# Patient Record
Sex: MALE | Race: WHITE | NOT HISPANIC OR LATINO | Employment: OTHER | URBAN - NONMETROPOLITAN AREA
[De-identification: names, ages, dates, MRNs, and addresses within clinical notes are randomized per-mention and may not be internally consistent; named-entity substitution may affect disease eponyms.]

---

## 2023-07-31 ENCOUNTER — HOSPITAL ENCOUNTER (EMERGENCY)
Facility: HOSPITAL | Age: 63
Discharge: HOME | End: 2023-07-31
Payer: MEDICARE

## 2023-07-31 VITALS
BODY MASS INDEX: 33.33 KG/M2 | HEART RATE: 73 BPM | WEIGHT: 225 LBS | SYSTOLIC BLOOD PRESSURE: 136 MMHG | DIASTOLIC BLOOD PRESSURE: 71 MMHG | TEMPERATURE: 98 F | RESPIRATION RATE: 16 BRPM | OXYGEN SATURATION: 95 % | HEIGHT: 69 IN

## 2023-07-31 DIAGNOSIS — S16.1XXA STRAIN OF NECK MUSCLE, INITIAL ENCOUNTER: ICD-10-CM

## 2023-07-31 DIAGNOSIS — M62.838 MUSCLE SPASMS OF NECK: Primary | ICD-10-CM

## 2023-07-31 DIAGNOSIS — M54.2 NECK PAIN: ICD-10-CM

## 2023-07-31 PROCEDURE — 63600175 PHARM REV CODE 636 W HCPCS: Performed by: NURSE PRACTITIONER

## 2023-07-31 PROCEDURE — 99284 EMERGENCY DEPT VISIT MOD MDM: CPT | Mod: GF | Performed by: NURSE PRACTITIONER

## 2023-07-31 PROCEDURE — 99284 EMERGENCY DEPT VISIT MOD MDM: CPT

## 2023-07-31 PROCEDURE — 96372 THER/PROPH/DIAG INJ SC/IM: CPT | Performed by: NURSE PRACTITIONER

## 2023-07-31 RX ORDER — CARVEDILOL 6.25 MG/1
6.25 TABLET ORAL 2 TIMES DAILY WITH MEALS
COMMUNITY

## 2023-07-31 RX ORDER — SACUBITRIL AND VALSARTAN 24; 26 MG/1; MG/1
1 TABLET, FILM COATED ORAL 2 TIMES DAILY
COMMUNITY

## 2023-07-31 RX ORDER — ORPHENADRINE CITRATE 30 MG/ML
60 INJECTION INTRAMUSCULAR; INTRAVENOUS
Status: COMPLETED | OUTPATIENT
Start: 2023-07-31 | End: 2023-07-31

## 2023-07-31 RX ORDER — CYCLOBENZAPRINE HCL 10 MG
10 TABLET ORAL 3 TIMES DAILY PRN
Qty: 15 TABLET | Refills: 0 | Status: SHIPPED | OUTPATIENT
Start: 2023-07-31 | End: 2023-08-05

## 2023-07-31 RX ORDER — DEXAMETHASONE SODIUM PHOSPHATE 4 MG/ML
4 INJECTION, SOLUTION INTRA-ARTICULAR; INTRALESIONAL; INTRAMUSCULAR; INTRAVENOUS; SOFT TISSUE
Status: COMPLETED | OUTPATIENT
Start: 2023-07-31 | End: 2023-07-31

## 2023-07-31 RX ORDER — METHYLPREDNISOLONE ACETATE 40 MG/ML
40 INJECTION, SUSPENSION INTRA-ARTICULAR; INTRALESIONAL; INTRAMUSCULAR; SOFT TISSUE ONCE
Status: COMPLETED | OUTPATIENT
Start: 2023-07-31 | End: 2023-07-31

## 2023-07-31 RX ORDER — EZETIMIBE 10 MG/1
10 TABLET ORAL DAILY
COMMUNITY

## 2023-07-31 RX ORDER — ROSUVASTATIN CALCIUM 20 MG/1
20 TABLET, COATED ORAL DAILY
COMMUNITY

## 2023-07-31 RX ORDER — CLOPIDOGREL BISULFATE 75 MG/1
75 TABLET ORAL DAILY
COMMUNITY

## 2023-07-31 RX ADMIN — DEXAMETHASONE SODIUM PHOSPHATE 4 MG: 4 INJECTION, SOLUTION INTRAMUSCULAR; INTRAVENOUS at 11:07

## 2023-07-31 RX ADMIN — METHYLPREDNISOLONE ACETATE 40 MG: 40 INJECTION, SUSPENSION INTRA-ARTICULAR; INTRALESIONAL; INTRAMUSCULAR; INTRASYNOVIAL; SOFT TISSUE at 11:07

## 2023-07-31 RX ADMIN — ORPHENADRINE CITRATE 60 MG: 60 INJECTION INTRAMUSCULAR; INTRAVENOUS at 11:07

## 2023-07-31 NOTE — DISCHARGE INSTRUCTIONS
May take Tylenol 500 mg one to two tabs by mouth every 6 hours as needed for pain.    Alternate ice pack 10-15 min with moist heat through out the day.  May use Biofreeze OTC as directed.    Take Flexeril as directed. Do not take while working or driving will cause drowsiness.

## 2023-07-31 NOTE — Clinical Note
"Eddi"Dayana Villanueva was seen and treated in our emergency department on 7/31/2023.  He may return to work on 08/02/2023.       If you have any questions or concerns, please don't hesitate to call.      ASHLY Caicedo"
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
## 2023-08-01 ENCOUNTER — TELEPHONE (OUTPATIENT)
Dept: EMERGENCY MEDICINE | Facility: HOSPITAL | Age: 63
End: 2023-08-01
Payer: MEDICARE

## 2024-09-18 ENCOUNTER — HOSPITAL ENCOUNTER (OUTPATIENT)
Facility: HOSPITAL | Age: 64
Discharge: HOME | End: 2024-09-18
Attending: ANESTHESIOLOGY | Admitting: ANESTHESIOLOGY
Payer: MEDICARE

## 2024-09-18 VITALS
SYSTOLIC BLOOD PRESSURE: 142 MMHG | DIASTOLIC BLOOD PRESSURE: 73 MMHG | OXYGEN SATURATION: 97 % | HEART RATE: 70 BPM | HEIGHT: 69 IN | BODY MASS INDEX: 34.66 KG/M2 | WEIGHT: 234 LBS | RESPIRATION RATE: 10 BRPM | TEMPERATURE: 99 F

## 2024-09-18 DIAGNOSIS — M70.60 TROCHANTERIC BURSITIS: ICD-10-CM

## 2024-09-18 PROCEDURE — 63600175 PHARM REV CODE 636 W HCPCS: Mod: JG | Performed by: ANESTHESIOLOGY

## 2024-09-18 PROCEDURE — 20610 DRAIN/INJ JOINT/BURSA W/O US: CPT | Mod: RT | Performed by: ANESTHESIOLOGY

## 2024-09-18 RX ORDER — BUPIVACAINE HYDROCHLORIDE 2.5 MG/ML
INJECTION, SOLUTION INFILTRATION; PERINEURAL CODE/TRAUMA/SEDATION MEDICATION
Status: DISCONTINUED | OUTPATIENT
Start: 2024-09-18 | End: 2024-09-18 | Stop reason: HOSPADM

## 2024-09-18 RX ORDER — TRIAMCINOLONE ACETONIDE 40 MG/ML
INJECTION, SUSPENSION INTRA-ARTICULAR; INTRAMUSCULAR CODE/TRAUMA/SEDATION MEDICATION
Status: DISCONTINUED | OUTPATIENT
Start: 2024-09-18 | End: 2024-09-18 | Stop reason: HOSPADM

## 2024-09-18 RX ORDER — SODIUM CHLORIDE 9 MG/ML
500 INJECTION, SOLUTION INTRAVENOUS CONTINUOUS
Status: DISCONTINUED | OUTPATIENT
Start: 2024-09-18 | End: 2024-09-18 | Stop reason: HOSPADM

## 2024-09-18 NOTE — OP NOTE
09/18/2024  Eddi Villanueva 1960    Preoperative diagnosis: Greater trochanteric bursitis     Postoperative diagnosis: Greater trochanteric bursitis     Procedure: Right greater trochanteric bursa injection     SURGEON: Dr. Branden Blake   COMPLICATIONS:  None                                        DRAINS AND PACKS:  None  ANESTHESIA:  Local                                             BLOOD LOSS:  None     The patient was identified in the holding area.  The risks and benefits of the procedure were again explained to the patient.  The patient agreed to proceed. The patients left GTB was marked with a skin pen and consent form was signed and obtained.  The patient was taken in stable condition to the operating room and placed in supine position on the C-Arm table. The site was prepped and draped in usual sterile fashion with Chloraprep.  A skin wheal of 0.25% (2.5mg/ml ) 1cc was raised over the target area of the greater trochanteric bursa.  A 25-gauge 31/2 inch needle was advanced down to osseous contact at the location of the greater trochanteric bursa.  A single injection of 3 cc 0.25% Bupivacaine(2.5mg/ml)  with Kenalog 40mg/ml  1 ml was injected without complication.  The patient tolerated procedure well with no adverse events.  She is monitored for the appropriate time of convalescence and discharged home in stable condition.

## 2024-09-18 NOTE — PLAN OF CARE
Plan:  D/c pt via wheelchair at 1335  Informed pt if does not void in 8 hours to go to ER. Notify if redness, drainage, from injection site or fever over next 3-4 days. Rest and drink plenty of fluids for the remainder of the day. No lifting over 5 lbs. For the remainder of the day. Continue regular medications as prescribed. May take pain medications as prescribed.     Pain improved 100%  Pre-procedure pain: 7  Post-procedure pain: 0

## 2024-09-18 NOTE — H&P
[facility]  Pain Management  H&P    Patient Name: Eddi Villanueva  MRN: [id number]  Admission Date: 2024  Primary Care Provider: Lissette Primary Doctor    Patient information was obtained from     Subjective:     Principal Problem:  Tatianna Alfaro FNP  [address]  [place] [address]  [phone]                   RE: Eddi Villanueva      : 1960   Date of Service: 2024   Existing Patient           Chief Complaint:   Ankle pain bilateral relieved by rest standing; pain rated as 2/10 on the pain scale,  Shoulder pain aching in nature, constant, dull; bilateral, radiating to arm; aggravated by activity, standing; relieved by ice, rest - lying down; gradual in onset constant; rated as 2/10 on pain scale.   Patient seated in room 1 with c/o Bilateral shoulder and feet pain, reports pain is better since last visit,        Controlled Substance Prescription Agreement signed and reviewed. Verbalizes understanding. 9/15/22  [place] Prescription Monitoring Program data was reviewed for this patient for the past 12 calendar months to ascertain any current, or past use of scheduled medications.                                                                                    Opioid Risk Tool                                                                                 Shaka each box                           Item Score                        Item Score                                                                              that applies.                               if Female.                          if Male                    1. Family history of substance abuse                  Alcohol  (  )                                      1                                     3                                                                              Illegal Drugs (  )                               2                                     3                                                                               Prescription Drugs (  )                     4                                     4        2. Personal History of substance abuse          Alcohol  (  )                                      3                                      3                                                                             Illegal Drugs (  )                               4                                     4                                                                             Prescription Drugs (  )                     5                                      5     3. Age (Shaka box if 16-45)                                           (  )                                          1                                      1        4. History of Preadolescent Sexual Abuse                   (  )                                         3                                      0        5. Psychological Disease    Attention Deficit disorder  (  )                                         2                                       2                                                             or                                              Obsessive Compulsive                                                           disorder                                                               or                                                                                       Bipolar Schizophrenia                                                              Depression                        (  )                                         1                                        1        Total=     Total Score Risk Category           Low risk 0-3         Moderate risk 4-7              High risk >8           History of Present Illness:   What part of the body? Bilateral shoulder and feet   Pain level at best 4; Pain level at worst 8; Pain level at present 2; Pain level on average 3   63 y/o male with complaints  of ankle and shoulder pain; he was able to see the orthopedic doctor after his last visit and was told that he probably had hip bursitis and OA and we did get these records to review; he is here today early due to going to [place] to visit sick sister ez law; he was given a hip IA injection that has helped with this pain and is still doing good with pain relief; he has been to see his PCP that did an xray that revealed severe changes to hip; he is interested in injections/procedures rather than surgery at this point and was able to have his CT Lspine that revealed degenerative changes; overall, pain has been slightly better but can tell hip pain has returned and is ready to have GTB scheduled to control pain; we did receive records from [facility]; states that his feet are much better and has not been limping as much as normal and is still walking better though and has been more active; shoulder pain has been better because he has not been as busy with extra stuff around the house but does have pain with activity; he has had a good PM check as well the past couple of months but feels like his battery will need replacing in a few months;  denies any recent falls; states that pain has been about the same except for worsening hip pain; overall, pain has been controlled and unchanged except for the severe joint pain; he only uses meds as needed; he has had some relief from massage with coconut butter and ice is manageable with these interventions; defers extra meds such as gabapentin and lyrica for the burning pain to feet but at this point, voltaren gel helps when it is worse at night; increased stiffness does improve when he gets up and gets moving; he initially fell off his porch last Feb and tore his rotator cuff of Left shoulder and has continued to have worsening pain; he completed PT as well as exercise at home but subsequently had an additional CT that found to have 3 additional tears but never had surgery to clean  these up due to breathing issues from the nerve block that was given to him; he has multiple cardiac issues but is currently seen by Dr. Hunter at [facility] and has history of CABG; he never had the rotator surgery due to the risk involved and has been dealing with this pain; he also has osgood shlatter disease with bone spurs to both knees and also has spurs to both feet; he has had surgery for calcaneus spur by Dr. Stock in May 2022 that caused worsening pain to the bottom of his heal that is severe at times and lasts for 3-4 days; he has been to see [facility] in the recent past but has not been to them since before his surgery; also states that ibuprofen has helped better but can not take on regular basis; he has taken Norco and Percocet  in the past that has both helped but requests Norco 7.5 mg at this point; he is still under the care of Dr. Stock and Dr. Moore; he also states that he needs knee surgery but is going to wait at this point; denies any neck or lower back pain; he has had partial excision on bone/talus/calcaneous and ruptured achilles tendon in June 2022; he has a regular follow up with Dr. Hunter every 6 months; he is also seeing someone at [facility] for PM checks; he has also been using Voltaren gel as needed to feet and shoulder     UDS: inconsistent x 3; consistent x 5   The previous urine drug screen was evaluated, and it was compliant for the medications that has been prescribed. A presumptive urine drug screen was done today to rapidly obtain and integrate results into clinical assessment and decision-making for ongoing safe prescribing of controlled substances. The results of the presumptive UDS done today was negative for opiates but did not have rx at that time. he is prescribed hydrocodone. Because presumptive UDS positive results are not definitive due to sensitivity and specificity and cross reactivity limitations and negative results do not necessarily indicate absence of drugs or  substances in the urine specimen, confirmation will identify specific prescribed and non-prescribed medications or illicit use for ongoing safe prescribing of controlled substances including benzodiazepines, opioid agonist, opioids antagonist, partial agonist, stimulants, muscle relaxers, antidepressants, sleep aids, anti-seizure medicine, and alcohol. Urine drug analysis is used to assist with diagnosis and therapeutic decision-making concerning pretreatment assessment. Intensity and frequency of monitoring with urine drug testing will be based on the risk stratification method in determining risk level for opioid addiction     Meds: Percocet 10 mg -- due 09/01/2024; request meds to be refilled and states that meds help with pain; CDC guidelines discussed with pt and voiced understanding;  reviewed and is appropriate; no misuse of meds and no opioid abuse; he is taking 22.5 mg of morphine equivalent meds/day      Allergies:   No Known Allergies Confirmed - 08/26/24 10:13:20 AM CST       Current Medications:   Medications List Reviewed (08/26/24 10:13:18 AM CST)  Tylenol Extra Strength Oral Tablet 500 MG (9/15/2022)  HYDROcodone-Acetaminophen Oral Tablet 7.5-325 MG (8/26/2024) Take 1 tablet three times a day as needed for 30 day(s)  Entresto Oral Tablet 24-26 MG (9/15/2022)  Ezetimibe Oral Tablet 10 MG (9/15/2022)  Rosuvastatin Calcium Oral Tablet 20 MG (9/15/2022)  Clopidogrel Bisulfate Oral Tablet 75 MG (9/15/2022)      Previous Studies:  CT SCAN CT [facility]ine at [facility]  07/08/2024  Impression:  multilevel degenerative changes throughout the lumber spine    L4-L5: diffuse disc bulging and facet degeneration, at least moderate spinal canal stenosis and left foraminal stenosis. mild to moderate right foraminal stenosis  L5-S1: disc osteophytic complex. facet degeneration. no spinal canal stenosis. mild foraminal stenosis         ; X-RAY; Findings  Bilateral Hip xray 06/04/2024  Impression  AP pelvis lateral hip show  some mild joint space narrowing with some periarticular osteophyte formation of the acetabulum on both sides   Past Medical History:   The patient has a past medical history of  Hypertension, Cardiovascular Disease, High Cholesterol.      Surgical History:   5/22 left rear heal spur removed  3/21 left shoulder rotator cuff repair  CABG, Stents , pacemaker , ablation      Social History:      Smoking Status: Current every day smoker; Last Reviewed: 08/26/2024            Pack-years: 1ppd                        Review of Systems:   General:  Patient denies  sweats, fatigue, fever, chills.  Ears, Nose and Throat:  Patient denies  hearing loss, ringing in the ears.  Cardiovascular:  Patient denies  chest pain.  Respiratory:  Patient denies  shortness of breath.  Gastrointestinal:  Patient denies  nausea, vomiting, diarrhea, constipation.  Genitourinary:   Patient admits to   prostate problems.  Patient denies  urinary frequency.  Endocrine:  Patient denies  thyroid problems.  Hematologic:  Patient denies  bleeding tendencies, easy bruising tendency.  Musculoskeletal:   Patient admits to   joint stiffness, muscle cramps.  Patient denies  joint pain, walking aids.  Neurologic  Patient denies  seizures, headache.  Psychologic:  Patient denies  anxiety, panic attacks, depression.  Skin:  Patient denies  pruritus, skin rash.       DEPRESSION SCREENING:   Date Depression Screening Last Done: 09/15/2022      Vital Signs:   Weight 226 lbs; Height 5 ft 9 in; BMI 33.4   08/26/2024 10:11 AM (CST)  Respiration Rate 18; Pulse Rate 87 bpm; Blood Pressure 133 / 79 mm/Hg; Pain Level: 2         Physical Examination:   Patient is alert and oriented to person, place, and time.  cranial nerve II through XII are grossly intact  Patient's is in no apparent distress  Patient does not demonstrate any slurred speech or somnolence    No apparent distress.  Left Shoulder     Pain with internal/external rotation  pain with flexion/extension  cross  "lateral reach painful     ROM is limited due to pain     AC joint tenderness   Back Motion:   Lumbar / lumbosacral spine normal.      Tenderness on Palpation:   Lumbosacral Spine:  There is tenderness on palpation of the  spinous process of L4- L5.         Additional Physical Findings:  General general appearance normal,   Awake, alert, oriented to time, place and person, pleasant, seated  Head normal head exam  Eyes normal eye exam  Musculoskeletal abnormal,   Shoulders abnormal, Knees abnormal, Ankles abnormal, Feet abnormal, joint tenderness  "knot" noted to both knees, non tender, ambulatory with limp  Neurologic normal neurologic exam,   Motor function, gait and stance abnormal       Toxicology Report   Toxicology was performed.   Reason for Toxicology:  A presumptive urine drug screen was done today to rapidly obtain and integrate results into clinical assessment and decision-making for ongoing safe prescribing of controlled substances.   Test Date/Time: 08/26/2024 00:00   Tested By: CODIE   Oxycodone  (OXY): Result = Negative; Control = Positive   Morphine  (OPI): Result = Positive; Control = Positive   Amphetamines  (AMP): Result = Negative; Control = Positive   Oxazepam  (BZO): Result = Negative; Control = Positive   Methadone  (MTD): Result = Negative; Control = Positive   Secobarbital  (BAR): Result = Negative; Control = Positive   Tricyclic Antidepressants  (TCA): Result = Negative; Control = Positive   Nortriptyline  (TCA): Result = Negative; Control = Positive   Marijuana-Carboxy Tetrahydrocannabinoid   (THC): Result = Negative; Control = Positive   Cocaine  (MIGUEL): Result = Negative; Control = Positive   Ecstasy-Methylenedioxymethamphetamine  (MDMA): Result = Negative; Control = Positive   D Methamphetamine  (MET): Result = Negative; Control = Positive   Phencyclidine  (PCP): Result = Negative; Control = Positive   Adulterants  (OX, SG, pH): Result = Negative; Control = Positive      Assessment: "   (M76.51) - Patellar tendonitis of both knees  (M25.579) - Pain, joint, ankle and foot  (M19.112) - Osteoarthritis of left shoulder due to rotator cuff injury  (G89.4) - Chronic pain syndrome  (M19.90) - Osteoarthritis  (M25.562) - Left knee pain  (M25.561) - Right knee pain  (M54.16) - Lumbar radiculopathy  (M70.61) - Trochanteric bursitis, right hip  (M25.551) - Pain in right hip      Plan:   Follow up visit 6 weeks      -refilled Norco 7.5 mg TID -- due today (ok to refill due to going out town urgently)  -gave rx for Oct with hold date 10/01/2024 (original hold date)   -drink plenty of fluids and water  -reviewed medical records from [facility] and it appeared that he has had R GTB injection at office on 06/04 by Dr. Park  -scheduled R GTB at [facility] without sedation on 09/18/2024 at 10:15 am         Analgesia: Patient reports adequate levels on analgesia.  Activity: Patient encouraged to exercise and continue normal activities.  Adverse Reactions:  No adverse reactions  Aberrant Behavior: No aberrant behavior noted.  appropriate and UDS consistent  Affect: Normal mood.  Adjuncts:        Functional Goals: Patient will have decreased pain with current medications and have increased function/activities.  Progress toward functioning goals: Pt will maintain/and or have increase in function and activity with current meds.  Reason for initiating/continuing opioids: Improve function, reduce pain, reduce use of er/urgent care and minimize organ toxicity from analgesics.  Continue medication doses as currently prescribed:  Other treatment modalities/referrals recommended:  Risks and benefits of test or referrals discussed:  Urine Drug screen ordered:  Contract/agreement signed or updated using lay language.  Follow up interval:  1-3 months  Follow up with Tatianna Rodriguez. The patient has chronic nonmalignant pain with pathology likely contributing to the symptoms and based on the improvement of pain and function in  response to opioid medications; lack of serious side effects and limited identifiable aberrant behavior; I believe it is reasonable to prescribe opioid therapy which requires a greater than 72 hours supply of opioid therapy. Patient was educated on the potential dependency issues associated with long-term opioid use; as well as the decreasing efficacy with prolonged use. Patient has been advised of the risk/benefits and side effect and how to utilize each medication. Patient was informed that and deviation from therapy protocol could lead to discontinuation of opioids. Patient was given the opportunity to begin weaning off opioids. Patient was given and opportunity to ask and have questions answered regarding the continuation of opioid therapy. At the time patient is at goal in terms of the pain treatment plan. Patients medications have been reviewed with patient. We will follow up with patient to review effectiveness of medication, and to discuss any potential side effects. The morphine milliequivalents (MME) have been calculated for this patient. According to the CDC guidelines, patients with an MME less than 50 should be monitored for pain and function frequently. Therefore this patient will be monitored every 1-3 months via office visits,  evaluations, and urinary drug screens.      2. Follow up in 1 month. Patient may contact office for earlier follow-up should an issue arise.        NARCOTIC STATEMENT  Patient is taking the narcotic pain medications as prescribed. Refill is being given because of the benefit to the patient in regards to the pain. Patient has agreed not to abuse of medication and not to take it more than what is prescribed. The nature of the drug including the potential for addiction and dependency and abuse was also discussed with the patient. Patient has developed physical dependency for the narcotic pain medication for his pain relief.  Patient has also developed tolerance to the sedative  effect of the narcotic pain medications.  Patient has decided to continue with these medications despite potential for addiction as described by this office.  This was stressed to the patient that it is the patient's responsibility to secure the narcotic medication and in any event of loss for any reason whatsoever,  there will be no refill before the next due date. Patient also understands that they are not supposed to drive or work on machinery while taking these medications.  Also explained to the patient that in the event of traffic citation, the presence of this drug in  bloodstream may result in DUI.  Patient has been advised not to drink alcohol while taking this medication.  Patient has verbalized understanding of our office policy and has signed a contract with us in this regard.               Prescriptions Written Today:  HYDROcodone-Acetaminophen Oral Tablet 7.5-325 MG  Take 1 tablet three times a day as needed for 30 day(s)  Refills: No Refills  Rx quantity: 90  Take 1 tablet three times a day as needed for 30 day(s)  Refills: No Refills  Rx quantity: 90                 Tatianna Alfaro       Electronically signed: 8/26/2024 4:10:28 PM      Branden Blake MD      Electronically signed: 8/27/2024 7:41:11 AM       Chief Complaint:      HPI:       Assessment/Plan:         Branden Blake MD  Pain Management  [facility]

## 2024-09-18 NOTE — DISCHARGE SUMMARY
Patient underwent   Right greater trochanteric bursa injection procedure 09/18/2024. The pt will follow up in clinic. Discharged home. Discharge Dx: Greater trochanteric bursitis

## 2025-01-22 ENCOUNTER — HOSPITAL ENCOUNTER (OUTPATIENT)
Facility: HOSPITAL | Age: 65
Discharge: HOME | End: 2025-01-22
Attending: ANESTHESIOLOGY | Admitting: ANESTHESIOLOGY
Payer: MEDICARE

## 2025-01-22 VITALS
OXYGEN SATURATION: 96 % | SYSTOLIC BLOOD PRESSURE: 102 MMHG | RESPIRATION RATE: 11 BRPM | HEART RATE: 81 BPM | DIASTOLIC BLOOD PRESSURE: 69 MMHG | BODY MASS INDEX: 34.5 KG/M2 | WEIGHT: 241 LBS | HEIGHT: 70 IN | TEMPERATURE: 98 F

## 2025-01-22 DIAGNOSIS — M70.60 TROCHANTERIC BURSITIS: ICD-10-CM

## 2025-01-22 PROCEDURE — 20610 DRAIN/INJ JOINT/BURSA W/O US: CPT | Mod: RT | Performed by: ANESTHESIOLOGY

## 2025-01-22 PROCEDURE — 63600175 PHARM REV CODE 636 W HCPCS: Performed by: ANESTHESIOLOGY

## 2025-01-22 RX ORDER — SODIUM CHLORIDE 9 MG/ML
500 INJECTION, SOLUTION INTRAVENOUS CONTINUOUS
Status: DISCONTINUED | OUTPATIENT
Start: 2025-01-22 | End: 2025-01-22 | Stop reason: HOSPADM

## 2025-01-22 RX ORDER — TRIAMCINOLONE ACETONIDE 40 MG/ML
INJECTION, SUSPENSION INTRA-ARTICULAR; INTRAMUSCULAR CODE/TRAUMA/SEDATION MEDICATION
Status: DISCONTINUED | OUTPATIENT
Start: 2025-01-22 | End: 2025-01-22 | Stop reason: HOSPADM

## 2025-01-22 RX ORDER — BUPIVACAINE HYDROCHLORIDE 2.5 MG/ML
INJECTION, SOLUTION INFILTRATION; PERINEURAL CODE/TRAUMA/SEDATION MEDICATION
Status: DISCONTINUED | OUTPATIENT
Start: 2025-01-22 | End: 2025-01-22 | Stop reason: HOSPADM

## 2025-01-22 NOTE — H&P
[facility] - Pain Management  Pain Management  H&P    Patient Name: Eddi Villanueva  MRN: [id number]  Admission Date: 2025  Primary Care Provider: Lissette Primary Doctor    Patient information was obtained from     Subjective:     Principal Problem:     Tatianna Alfaro FNP  [address]  [place] [address]  [phone]                   RE: Eddi Villanueva      : 1960   Date of Service: 2024   Existing Patient           Chief Complaint:   Ankle pain bilateral relieved by rest standing; pain rated as 2/10 on the pain scale,  Shoulder pain aching in nature, constant, dull; bilateral, radiating to arm; aggravated by activity, standing; relieved by ice, rest - lying down; gradual in onset constant; rated as 2/10 on pain scale.   Patient seated in room 2 with c/o Bilateral shoulder and feet pain, reports pain is better since last visit,        Controlled Substance Prescription Agreement signed and reviewed. Verbalizes understanding. 9/15/22  [place] Prescription Monitoring Program data was reviewed for this patient for the past 12 calendar months to ascertain any current, or past use of scheduled medications.                                                                                    Opioid Risk Tool                                                                                 Shaka each box                           Item Score                        Item Score                                                                              that applies.                               if Female.                          if Male                    1. Family history of substance abuse                  Alcohol  (  )                                      1                                     3                                                                              Illegal Drugs (  )                               2                                     3                                                                               Prescription Drugs (  )                     4                                     4        2. Personal History of substance abuse          Alcohol  (  )                                      3                                      3                                                                             Illegal Drugs (  )                               4                                     4                                                                             Prescription Drugs (  )                     5                                      5     3. Age (Shaka box if 16-45)                                           (  )                                          1                                      1        4. History of Preadolescent Sexual Abuse                   (  )                                         3                                      0        5. Psychological Disease    Attention Deficit disorder  (  )                                         2                                       2                                                             or                                              Obsessive Compulsive                                                           disorder                                                               or                                                                                       Bipolar Schizophrenia                                                              Depression                        (  )                                         1                                        1        Total=     Total Score Risk Category           Low risk 0-3         Moderate risk 4-7              High risk >8           History of Present Illness:   What part of the body? Bilateral shoulder and feet   Pain level at best 4; Pain level at worst 8; Pain level at present 2; Pain level on average 3   65 y/o male with  complaints of ankle and shoulder pain; he has had a really bad episode of worsening hip pain but has started wearing new shoes that completely helped pain for several days; pain has gotten worse with walking up and down stairs and has needed his walking cane as needed; we will schedule injection for Jan but he is scheduled for pacemaker battery replaced on 12/30/2024; he was able to see the orthopedic doctor after his last visit and was told that he probably had hip bursitis and OA and was able to have his R GTB in Sept 2024 that improved pain by 75% that lasted for several months; he was given a hip IA injection that has helped with this pain (according to records, he had a GTB) he has been to see his PCP that did an xray that revealed severe changes to hip; he is interested in injections/procedures rather than surgery at this point and was able to have his CT Lspine that revealed degenerative changes; we did receive records from [facility]; states that his feet are much better and has not been limping as much as normal and is still walking better though and has been more active; shoulder pain has been better because he has not been as busy with extra stuff around the house but does have pain with activity; he has had a good PM check as well the past couple of months but feels like his battery will need replacing in a few months;  denies any recent falls; states that pain has been about the same except for worsening hip pain; overall, pain has been controlled and unchanged except for the severe joint pain; he only uses meds as needed; he has had some relief from massage with coconut butter and ice is manageable with these interventions; defers extra meds such as gabapentin and lyrica for the burning pain to feet but at this point, voltaren gel helps when it is worse at night; increased stiffness does improve when he gets up and gets moving; he initially fell off his porch last Feb and tore his rotator cuff of  Left shoulder and has continued to have worsening pain; he completed PT as well as exercise at home but subsequently had an additional CT that found to have 3 additional tears but never had surgery to clean these up due to breathing issues from the nerve block that was given to him; he has multiple cardiac issues but is currently seen by Dr. Hunter at [facility] and has history of CABG; he never had the rotator surgery due to the risk involved and has been dealing with this pain; he also has osgood shlatter disease with bone spurs to both knees and also has spurs to both feet; he has had surgery for calcaneus spur by Dr. Stock in May 2022 that caused worsening pain to the bottom of his heal that is severe at times and lasts for 3-4 days; he has been to see [facility] in the recent past but has not been to them since before his surgery; also states that ibuprofen has helped better but can not take on regular basis; he has taken Norco and Percocet  in the past that has both helped but requests Norco 7.5 mg at this point; he is still under the care of Dr. Stock and Dr. Moore; he also states that he needs knee surgery but is going to wait at this point; denies any neck or lower back pain; he has had partial excision on bone/talus/calcaneous and ruptured achilles tendon in June 2022; he has a regular follow up with Dr. Hunter every 6 months; he is also seeing someone at [facility] for PM checks; he has also been using Voltaren gel as needed to feet and shoulder     UDS: inconsistent x 3; consistent x 6  The previous urine drug screen was evaluated, and it was compliant for the medications that has been prescribed. A presumptive urine drug screen was done today to rapidly obtain and integrate results into clinical assessment and decision-making for ongoing safe prescribing of controlled substances. The results of the presumptive UDS done today was negative for opiates but did not have rx at that time. he is prescribed  hydrocodone. Because presumptive UDS positive results are not definitive due to sensitivity and specificity and cross reactivity limitations and negative results do not necessarily indicate absence of drugs or substances in the urine specimen, confirmation will identify specific prescribed and non-prescribed medications or illicit use for ongoing safe prescribing of controlled substances including benzodiazepines, opioid agonist, opioids antagonist, partial agonist, stimulants, muscle relaxers, antidepressants, sleep aids, anti-seizure medicine, and alcohol. Urine drug analysis is used to assist with diagnosis and therapeutic decision-making concerning pretreatment assessment. Intensity and frequency of monitoring with urine drug testing will be based on the risk stratification method in determining risk level for opioid addiction     Meds: Percocet 10 mg -- due 12/282024; request meds to be refilled and states that meds help with pain; CDC guidelines discussed with pt and voiced understanding;  reviewed and is appropriate; no misuse of meds and no opioid abuse; he is taking 22.5 mg of morphine equivalent meds/day      Current Medications:   Medications List Reviewed (12/17/24 10:47:00 AM CST)  Tylenol Extra Strength Oral Tablet 500 MG (9/15/2022)  HYDROcodone-Acetaminophen Oral Tablet 7.5-325 MG (12/17/2024) Take 1 tablet three times a day as needed for 30 day(s)  Entresto Oral Tablet 24-26 MG (9/15/2022)  Ezetimibe Oral Tablet 10 MG (9/15/2022)  Rosuvastatin Calcium Oral Tablet 20 MG (9/15/2022)  Clopidogrel Bisulfate Oral Tablet 75 MG (9/15/2022)      Previous Studies:  CT SCAN CT Lspine at [facility]  07/08/2024  Impression:  multilevel degenerative changes throughout the lumber spine    L4-L5: diffuse disc bulging and facet degeneration, at least moderate spinal canal stenosis and left foraminal stenosis. mild to moderate right foraminal stenosis  L5-S1: disc osteophytic complex. facet degeneration. no spinal  canal stenosis. mild foraminal stenosis         ; X-RAY; Findings  Bilateral Hip xray 06/04/2024  Impression  AP pelvis lateral hip show some mild joint space narrowing with some periarticular osteophyte formation of the acetabulum on both sides   Past Medical History:   The patient has a past medical history of  Hypertension, Cardiovascular Disease, High Cholesterol.      Surgical History:   5/22 left rear heal spur removed  3/21 left shoulder rotator cuff repair  CABG, Stents , pacemaker , ablation      Social History:      Smoking Status: Current every day smoker; Last Reviewed: 12/17/2024            Pack-years: 1ppd                        Review of Systems:   General:  Patient denies  sweats, fatigue, fever, chills.  Ears, Nose and Throat:  Patient denies  hearing loss, ringing in the ears.  Cardiovascular:  Patient denies  chest pain.  Respiratory:  Patient denies  shortness of breath.  Gastrointestinal:  Patient denies  nausea, vomiting, diarrhea, constipation.  Genitourinary:   Patient admits to   prostate problems.  Patient denies  urinary frequency.  Endocrine:  Patient denies  thyroid problems.  Hematologic:  Patient denies  bleeding tendencies, easy bruising tendency.  Musculoskeletal:   Patient admits to   joint stiffness, muscle cramps.  Patient denies  joint pain, walking aids.  Neurologic  Patient denies  seizures, headache.  Psychologic:  Patient denies  anxiety, panic attacks, depression.  Skin:  Patient denies  pruritus, skin rash.       DEPRESSION SCREENING:   Date Depression Screening Last Done: 09/15/2022      Vital Signs:   Weight 241 lbs; Height 5 ft 9 in; BMI 35.6   12/17/2024 10:37 AM (CST)  Respiration Rate 18; Pulse Rate 87 bpm; Blood Pressure 128 / 82 mm/Hg; Pain Level: 4         Physical Examination:   Patient is alert and oriented to person, place, and time.  cranial nerve II through XII are grossly intact  Patient's is in no apparent distress  Patient does not demonstrate any slurred  "speech or somnolence    No apparent distress.  Left Shoulder     Pain with internal/external rotation  pain with flexion/extension  cross lateral reach painful     ROM is limited due to pain     AC joint tenderness   Back Motion:   Lumbar / lumbosacral spine normal.      Tenderness on Palpation:   Lumbosacral Spine:  There is tenderness on palpation of the  spinous process of L4- L5.         Additional Physical Findings:  General general appearance normal,   Awake, alert, oriented to time, place and person, pleasant, seated  Head normal head exam  Eyes normal eye exam  Musculoskeletal abnormal,   Shoulders abnormal, Knees abnormal, Ankles abnormal, Feet abnormal, joint tenderness  "knot" noted to both knees, non tender, ambulatory with limp  Neurologic normal neurologic exam,   Motor function, gait and stance abnormal       Toxicology Report   Toxicology was performed.   Reason for Toxicology:  A presumptive urine drug screen was done today to rapidly obtain and integrate results into clinical assessment and decision-making for ongoing safe prescribing of controlled substances.   Test Date/Time: 12/17/2024 00:00   Tested By: CODIE   Oxycodone  (OXY): Result = Negative; Control = Positive   Morphine  (OPI): Result = Positive; Control = Positive   Amphetamines  (AMP): Result = Negative; Control = Positive   Oxazepam  (BZO): Result = Negative; Control = Positive   Methadone  (MTD): Result = Negative; Control = Positive   Secobarbital  (BAR): Result = Negative; Control = Positive   Tricyclic Antidepressants  (TCA): Result = Negative; Control = Positive   Nortriptyline  (TCA): Result = Negative; Control = Positive   Marijuana-Carboxy Tetrahydrocannabinoid   (THC): Result = Negative; Control = Positive   Cocaine  (MIGUEL): Result = Negative; Control = Positive   Ecstasy-Methylenedioxymethamphetamine  (MDMA): Result = Negative; Control = Positive   D Methamphetamine  (MET): Result = Negative; Control = Positive   Phencyclidine  " (PCP): Result = Negative; Control = Positive   Adulterants  (OX, SG, pH): Result = Negative; Control = Positive      Assessment:   (M76.51) - Patellar tendonitis of both knees  (M25.579) - Pain, joint, ankle and foot  (M19.112) - Osteoarthritis of left shoulder due to rotator cuff injury  (G89.4) - Chronic pain syndrome  (M19.90) - Osteoarthritis  (M25.562) - Left knee pain  (M25.561) - Right knee pain  (M54.16) - Lumbar radiculopathy  (M70.61) - Trochanteric bursitis, right hip  (M25.551) - Pain in right hip      Plan:   Follow up visit 8 weeks      -refilled Norco 7.5 mg TID -- due 12/28/2024 (ok to refill due to being OOT)  -gave rx for Ricardo with hold date 01/29/2025 (original hold date)  -drink plenty of fluids and water  -reviewed medical records from [facility] and it appeared that he has had R GTB injection at office on 06/04 by Dr. Park  -scheduled R GTB #2 without sedation at [facility] on 01/22/2025 at 8:45 am         Analgesia: Patient reports adequate levels on analgesia.  Activity: Patient encouraged to exercise and continue normal activities.  Adverse Reactions:  No adverse reactions  Aberrant Behavior: No aberrant behavior noted.  appropriate and UDS consistent  Affect: Normal mood.  Adjuncts:        Functional Goals: Patient will have decreased pain with current medications and have increased function/activities.  Progress toward functioning goals: Pt will maintain/and or have increase in function and activity with current meds.  Reason for initiating/continuing opioids: Improve function, reduce pain, reduce use of er/urgent care and minimize organ toxicity from analgesics.  Continue medication doses as currently prescribed:  Other treatment modalities/referrals recommended:  Risks and benefits of test or referrals discussed:  Urine Drug screen ordered:  Contract/agreement signed or updated using lay language.  Follow up interval:  1-3 months  Follow up with Tatianna Rodriguez. The patient has chronic  nonmalignant pain with pathology likely contributing to the symptoms and based on the improvement of pain and function in response to opioid medications; lack of serious side effects and limited identifiable aberrant behavior; I believe it is reasonable to prescribe opioid therapy which requires a greater than 72 hours supply of opioid therapy. Patient was educated on the potential dependency issues associated with long-term opioid use; as well as the decreasing efficacy with prolonged use. Patient has been advised of the risk/benefits and side effect and how to utilize each medication. Patient was informed that and deviation from therapy protocol could lead to discontinuation of opioids. Patient was given the opportunity to begin weaning off opioids. Patient was given and opportunity to ask and have questions answered regarding the continuation of opioid therapy. At the time patient is at goal in terms of the pain treatment plan. Patients medications have been reviewed with patient. We will follow up with patient to review effectiveness of medication, and to discuss any potential side effects. The morphine milliequivalents (MME) have been calculated for this patient. According to the CDC guidelines, patients with an MME less than 50 should be monitored for pain and function frequently. Therefore this patient will be monitored every 1-3 months via office visits,  evaluations, and urinary drug screens.      2. Follow up in 2 month. Patient may contact office for earlier follow-up should an issue arise.        NARCOTIC STATEMENT  Patient is taking the narcotic pain medications as prescribed. Refill is being given because of the benefit to the patient in regards to the pain. Patient has agreed not to abuse of medication and not to take it more than what is prescribed. The nature of the drug including the potential for addiction and dependency and abuse was also discussed with the patient. Patient has developed  physical dependency for the narcotic pain medication for his pain relief.  Patient has also developed tolerance to the sedative effect of the narcotic pain medications.  Patient has decided to continue with these medications despite potential for addiction as described by this office.  This was stressed to the patient that it is the patient's responsibility to secure the narcotic medication and in any event of loss for any reason whatsoever,  there will be no refill before the next due date. Patient also understands that they are not supposed to drive or work on machinery while taking these medications.  Also explained to the patient that in the event of traffic citation, the presence of this drug in  bloodstream may result in DUI.  Patient has been advised not to drink alcohol while taking this medication.  Patient has verbalized understanding of our office policy and has signed a contract with us in this regard.               Prescriptions Written Today:  HYDROcodone-Acetaminophen Oral Tablet 7.5-325 MG  Take 1 tablet three times a day as needed for 30 day(s)  Refills: No Refills  Rx quantity: 90  Take 1 tablet three times a day as needed for 30 day(s)  Refills: No Refills  Rx quantity: 90                 Tatianna Alfaro       Electronically signed: 12/17/2024 1:24:19 PM      Branden Blake MD      Electronically signed: 12/30/2024 7:27:54 AM               Chief Complaint:      HPI:       Assessment/Plan:         Branden Blake MD  Pain Management  [facility]

## 2025-01-22 NOTE — PLAN OF CARE
No driving, operating machinery, making legal decisions, or signing legal documents until tomorrow.   Continue diet as tolerated. Drink plenty of fluids and rest.   If unable to void in 8 hours proceed to nearest ER.   Notify MD of redness or drainage from incision site as well as any fever over the next 3-4 days.  No lifting over 5 lbs for the next 24 hrs.   Continue medications as prescribed. May take pain medication as prescribed.   May shower tomorrow. No tub baths for 48 hours following procedure.   May remove bandaids tomorrow, if they fall off before tomorrow they do not have to be replaced.    If you experience any uncontrolled pain, nausea or vomiting after your procedure, do not hesitate to call the clinic.    Pre pain- 3  Post pain- 0

## 2025-01-22 NOTE — OP NOTE
01/22/2025  Eddi Villanueva 1960    Preoperative diagnosis: Greater trochanteric bursitis Right      Postoperative diagnosis: Greater trochanteric bursitis      Procedure: Right greater trochanteric bursa injection     SURGEON: Dr. Branden Blake   COMPLICATIONS:  None                                        DRAINS AND PACKS:  None  ANESTHESIA:  Local                                             BLOOD LOSS:  None     The patient was identified in the holding area.  The risks and benefits of the procedure were again explained to the patient.  The patient agreed to proceed. The patients Right GTB was marked with a skin pen and consent form was signed and obtained.  The patient was taken in stable condition to the operating room and placed in supine position on the C-Arm table. The site was prepped and draped in usual sterile fashion with Chloraprep.  A skin wheal of 0.25% (2.5mg/ml ) 1cc was raised over the target area of the greater trochanteric bursa.  A 25-gauge 31/2 inch needle was advanced down to osseous contact at the location of the greater trochanteric bursa.  A single injection of 3 cc 0.25% Bupivacaine(2.5mg/ml)  with Kenalog 40mg/ml 1ml was injected without complication.  The patient tolerated procedure well with no adverse events.  She is monitored for the appropriate time of convalescence and discharged home in stable condition.    Pre pain 3/10  Post pain

## 2025-01-22 NOTE — DISCHARGE SUMMARY
Patient underwent Right greater trochanteric bursa injection procedure 01/22/2025. The pt will follow up in clinic. Discharged home. Discharge Dx: Greater trochanteric bursitis Right

## 2025-08-28 DIAGNOSIS — G47.33 OBSTRUCTIVE SLEEP APNEA: Primary | ICD-10-CM

## (undated) DEVICE — GLOVE SENSICARE PI ALOE 8

## (undated) DEVICE — TRAY NERVE BLOCK UNIV 10/CA

## (undated) DEVICE — APPLICATOR CHLORAPREP ORN 26ML

## (undated) DEVICE — NDL TUOHY 22G X 3.5

## (undated) DEVICE — GLOVE SENSICARE PI SURG 6.5

## (undated) DEVICE — OXISENSOR ADULT DIGIT N/S